# Patient Record
Sex: FEMALE
[De-identification: names, ages, dates, MRNs, and addresses within clinical notes are randomized per-mention and may not be internally consistent; named-entity substitution may affect disease eponyms.]

---

## 2022-05-09 ENCOUNTER — APPOINTMENT (OUTPATIENT)
Dept: AFTER HOURS CARE | Facility: EMERGENCY ROOM | Age: 87
End: 2022-05-09
Payer: MEDICARE

## 2022-05-09 ENCOUNTER — TRANSCRIPTION ENCOUNTER (OUTPATIENT)
Age: 87
End: 2022-05-09

## 2022-05-09 DIAGNOSIS — U07.1 COVID-19: ICD-10-CM

## 2022-05-09 PROBLEM — Z00.00 ENCOUNTER FOR PREVENTIVE HEALTH EXAMINATION: Status: ACTIVE | Noted: 2022-05-09

## 2022-05-09 PROCEDURE — 99204 OFFICE O/P NEW MOD 45 MIN: CPT | Mod: CS,95

## 2022-06-14 PROBLEM — U07.1 COVID-19 VIRUS INFECTION: Status: ACTIVE | Noted: 2022-06-14

## 2022-06-14 NOTE — PHYSICAL EXAM
[de-identified] : General: pt appears stated age and is in nad, speaking in full clear sentences\par HEENT: AT/NC, pink conjunctiva, anicteric sclerae, EOMI, mmm\par Neck: full ROM, trachea midline\par Lungs: no respiratory distress\par Neuro: awake, alert, responsive; oriented to person, place and time; cranial nerves grossly intact, EOMI intact jaw movement, no facial asymmetry, hearing intact\par \par

## 2022-06-14 NOTE — HISTORY OF PRESENT ILLNESS
[Home] : at home, [unfilled] , at the time of the visit. [Other Location: e.g. Home (Enter Location, City,State)___] : at [unfilled] [Other:____] : [unfilled] [Verbal consent obtained from patient] : the patient, [unfilled] [Other: _____] : [unfilled] [FreeTextEntry8] : 89 Yo f PMH HTN, CHF, CAD, cardiac valve disease, now p/w covid19 symptoms. Pt reports onset of symptoms 2 days ago, which include nausea, diarrhea, cough, temp of 100F yesterday. Pt is s/p covid19 vaccine booster April 6th 2022. She tested positive for covid this morning.

## 2022-06-14 NOTE — PLAN
[FreeTextEntry1] : \par 1)	Will send the referral for [facility] via [facility]\par 2)	Pt encouraged to hydrate with gatorade	\par 3)	Pt advised to take ibuprofen or acetaminophen as needed for bodyaches or fevers\par

## 2022-06-14 NOTE — ASSESSMENT
[FreeTextEntry1] : Pt w/ aforementioned presentation concerning for covid19. Pt has no dyspnea, no respiratory distress, requesting referral for MAB.